# Patient Record
Sex: FEMALE | Race: ASIAN | NOT HISPANIC OR LATINO | URBAN - METROPOLITAN AREA
[De-identification: names, ages, dates, MRNs, and addresses within clinical notes are randomized per-mention and may not be internally consistent; named-entity substitution may affect disease eponyms.]

---

## 2020-05-15 ENCOUNTER — INPATIENT (INPATIENT)
Facility: HOSPITAL | Age: 35
LOS: 2 days | Discharge: HOME | End: 2020-05-18
Attending: OBSTETRICS & GYNECOLOGY | Admitting: OBSTETRICS & GYNECOLOGY
Payer: MEDICAID

## 2020-05-15 DIAGNOSIS — Z3A.00 WEEKS OF GESTATION OF PREGNANCY NOT SPECIFIED: ICD-10-CM

## 2020-05-15 DIAGNOSIS — O26.899 OTHER SPECIFIED PREGNANCY RELATED CONDITIONS, UNSPECIFIED TRIMESTER: ICD-10-CM

## 2020-05-15 DIAGNOSIS — Z34.80 ENCOUNTER FOR SUPERVISION OF OTHER NORMAL PREGNANCY, UNSPECIFIED TRIMESTER: ICD-10-CM

## 2020-05-15 LAB
BASOPHILS # BLD AUTO: 0.04 K/UL — SIGNIFICANT CHANGE UP (ref 0–0.2)
BASOPHILS NFR BLD AUTO: 0.4 % — SIGNIFICANT CHANGE UP (ref 0–2)
BLD GP AB SCN SERPL QL: NEGATIVE — SIGNIFICANT CHANGE UP
EOSINOPHIL # BLD AUTO: 0.18 K/UL — SIGNIFICANT CHANGE UP (ref 0–0.5)
EOSINOPHIL NFR BLD AUTO: 1.9 % — SIGNIFICANT CHANGE UP (ref 0–6)
GLUCOSE BLDC GLUCOMTR-MCNC: 117 MG/DL — HIGH (ref 70–99)
GLUCOSE BLDC GLUCOMTR-MCNC: 121 MG/DL — HIGH (ref 70–99)
HCT VFR BLD CALC: 31 % — LOW (ref 34.5–45)
HGB BLD-MCNC: 9.8 G/DL — LOW (ref 11.5–15.5)
IMM GRANULOCYTES NFR BLD AUTO: 0.7 % — SIGNIFICANT CHANGE UP (ref 0–1.5)
LYMPHOCYTES # BLD AUTO: 1.38 K/UL — SIGNIFICANT CHANGE UP (ref 1–3.3)
LYMPHOCYTES # BLD AUTO: 14.2 % — SIGNIFICANT CHANGE UP (ref 13–44)
MCHC RBC-ENTMCNC: 29.7 PG — SIGNIFICANT CHANGE UP (ref 27–34)
MCHC RBC-ENTMCNC: 31.6 GM/DL — LOW (ref 32–36)
MCV RBC AUTO: 93.9 FL — SIGNIFICANT CHANGE UP (ref 80–100)
MONOCYTES # BLD AUTO: 0.76 K/UL — SIGNIFICANT CHANGE UP (ref 0–0.9)
MONOCYTES NFR BLD AUTO: 7.8 % — SIGNIFICANT CHANGE UP (ref 2–14)
NEUTROPHILS # BLD AUTO: 7.27 K/UL — SIGNIFICANT CHANGE UP (ref 1.8–7.4)
NEUTROPHILS NFR BLD AUTO: 75 % — SIGNIFICANT CHANGE UP (ref 43–77)
NRBC # BLD: 0 /100 WBCS — SIGNIFICANT CHANGE UP (ref 0–0)
PLATELET # BLD AUTO: 213 K/UL — SIGNIFICANT CHANGE UP (ref 150–400)
RBC # BLD: 3.3 M/UL — LOW (ref 3.8–5.2)
RBC # FLD: 14.7 % — HIGH (ref 10.3–14.5)
RH IG SCN BLD-IMP: POSITIVE — SIGNIFICANT CHANGE UP
RH IG SCN BLD-IMP: POSITIVE — SIGNIFICANT CHANGE UP
SARS-COV-2 RNA SPEC QL NAA+PROBE: SIGNIFICANT CHANGE UP
WBC # BLD: 9.7 K/UL — SIGNIFICANT CHANGE UP (ref 3.8–10.5)
WBC # FLD AUTO: 9.7 K/UL — SIGNIFICANT CHANGE UP (ref 3.8–10.5)

## 2020-05-15 RX ORDER — SODIUM CHLORIDE 9 MG/ML
1000 INJECTION, SOLUTION INTRAVENOUS
Refills: 0 | Status: DISCONTINUED | OUTPATIENT
Start: 2020-05-15 | End: 2020-05-16

## 2020-05-15 RX ORDER — OXYTOCIN 10 UNIT/ML
333.33 VIAL (ML) INJECTION
Qty: 20 | Refills: 0 | Status: DISCONTINUED | OUTPATIENT
Start: 2020-05-15 | End: 2020-05-18

## 2020-05-15 RX ORDER — CITRIC ACID/SODIUM CITRATE 300-500 MG
15 SOLUTION, ORAL ORAL EVERY 6 HOURS
Refills: 0 | Status: DISCONTINUED | OUTPATIENT
Start: 2020-05-15 | End: 2020-05-16

## 2020-05-15 RX ORDER — SODIUM CHLORIDE 9 MG/ML
1000 INJECTION INTRAMUSCULAR; INTRAVENOUS; SUBCUTANEOUS
Refills: 0 | Status: DISCONTINUED | OUTPATIENT
Start: 2020-05-15 | End: 2020-05-16

## 2020-05-16 VITALS — WEIGHT: 167.55 LBS | HEIGHT: 62 IN

## 2020-05-16 LAB — T PALLIDUM AB TITR SER: NEGATIVE — SIGNIFICANT CHANGE UP

## 2020-05-16 RX ORDER — MAGNESIUM HYDROXIDE 400 MG/1
30 TABLET, CHEWABLE ORAL
Refills: 0 | Status: DISCONTINUED | OUTPATIENT
Start: 2020-05-16 | End: 2020-05-18

## 2020-05-16 RX ORDER — OXYTOCIN 10 UNIT/ML
4 VIAL (ML) INJECTION
Qty: 30 | Refills: 0 | Status: DISCONTINUED | OUTPATIENT
Start: 2020-05-16 | End: 2020-05-16

## 2020-05-16 RX ORDER — BENZOCAINE 10 %
1 GEL (GRAM) MUCOUS MEMBRANE EVERY 6 HOURS
Refills: 0 | Status: DISCONTINUED | OUTPATIENT
Start: 2020-05-16 | End: 2020-05-18

## 2020-05-16 RX ORDER — DIPHENHYDRAMINE HCL 50 MG
25 CAPSULE ORAL EVERY 6 HOURS
Refills: 0 | Status: DISCONTINUED | OUTPATIENT
Start: 2020-05-16 | End: 2020-05-18

## 2020-05-16 RX ORDER — AER TRAVELER 0.5 G/1
1 SOLUTION RECTAL; TOPICAL EVERY 4 HOURS
Refills: 0 | Status: DISCONTINUED | OUTPATIENT
Start: 2020-05-16 | End: 2020-05-18

## 2020-05-16 RX ORDER — IBUPROFEN 200 MG
600 TABLET ORAL EVERY 6 HOURS
Refills: 0 | Status: COMPLETED | OUTPATIENT
Start: 2020-05-16 | End: 2021-04-14

## 2020-05-16 RX ORDER — IBUPROFEN 200 MG
600 TABLET ORAL EVERY 6 HOURS
Refills: 0 | Status: DISCONTINUED | OUTPATIENT
Start: 2020-05-16 | End: 2020-05-18

## 2020-05-16 RX ORDER — ONDANSETRON 8 MG/1
4 TABLET, FILM COATED ORAL ONCE
Refills: 0 | Status: COMPLETED | OUTPATIENT
Start: 2020-05-16 | End: 2020-05-16

## 2020-05-16 RX ORDER — OXYTOCIN 10 UNIT/ML
333.33 VIAL (ML) INJECTION
Qty: 20 | Refills: 0 | Status: DISCONTINUED | OUTPATIENT
Start: 2020-05-16 | End: 2020-05-18

## 2020-05-16 RX ORDER — BACITRACIN ZINC 500 UNIT/G
1 OINTMENT IN PACKET (EA) TOPICAL ONCE
Refills: 0 | Status: COMPLETED | OUTPATIENT
Start: 2020-05-16 | End: 2020-05-16

## 2020-05-16 RX ORDER — DIBUCAINE 1 %
1 OINTMENT (GRAM) RECTAL EVERY 6 HOURS
Refills: 0 | Status: DISCONTINUED | OUTPATIENT
Start: 2020-05-16 | End: 2020-05-18

## 2020-05-16 RX ORDER — KETOROLAC TROMETHAMINE 30 MG/ML
30 SYRINGE (ML) INJECTION ONCE
Refills: 0 | Status: DISCONTINUED | OUTPATIENT
Start: 2020-05-16 | End: 2020-05-16

## 2020-05-16 RX ORDER — HYDROCORTISONE 1 %
1 OINTMENT (GRAM) TOPICAL EVERY 6 HOURS
Refills: 0 | Status: DISCONTINUED | OUTPATIENT
Start: 2020-05-16 | End: 2020-05-18

## 2020-05-16 RX ORDER — OXYCODONE HYDROCHLORIDE 5 MG/1
5 TABLET ORAL ONCE
Refills: 0 | Status: DISCONTINUED | OUTPATIENT
Start: 2020-05-16 | End: 2020-05-18

## 2020-05-16 RX ORDER — SODIUM CHLORIDE 9 MG/ML
3 INJECTION INTRAMUSCULAR; INTRAVENOUS; SUBCUTANEOUS EVERY 8 HOURS
Refills: 0 | Status: DISCONTINUED | OUTPATIENT
Start: 2020-05-16 | End: 2020-05-18

## 2020-05-16 RX ORDER — ACETAMINOPHEN 500 MG
975 TABLET ORAL
Refills: 0 | Status: DISCONTINUED | OUTPATIENT
Start: 2020-05-16 | End: 2020-05-18

## 2020-05-16 RX ORDER — LANOLIN
1 OINTMENT (GRAM) TOPICAL EVERY 6 HOURS
Refills: 0 | Status: DISCONTINUED | OUTPATIENT
Start: 2020-05-16 | End: 2020-05-18

## 2020-05-16 RX ORDER — INFLUENZA VIRUS VACCINE 15; 15; 15; 15 UG/.5ML; UG/.5ML; UG/.5ML; UG/.5ML
0.5 SUSPENSION INTRAMUSCULAR ONCE
Refills: 0 | Status: DISCONTINUED | OUTPATIENT
Start: 2020-05-16 | End: 2020-05-18

## 2020-05-16 RX ORDER — OXYCODONE HYDROCHLORIDE 5 MG/1
5 TABLET ORAL
Refills: 0 | Status: DISCONTINUED | OUTPATIENT
Start: 2020-05-16 | End: 2020-05-18

## 2020-05-16 RX ORDER — PRAMOXINE HYDROCHLORIDE 150 MG/15G
1 AEROSOL, FOAM RECTAL EVERY 4 HOURS
Refills: 0 | Status: DISCONTINUED | OUTPATIENT
Start: 2020-05-16 | End: 2020-05-18

## 2020-05-16 RX ORDER — TETANUS TOXOID, REDUCED DIPHTHERIA TOXOID AND ACELLULAR PERTUSSIS VACCINE, ADSORBED 5; 2.5; 8; 8; 2.5 [IU]/.5ML; [IU]/.5ML; UG/.5ML; UG/.5ML; UG/.5ML
0.5 SUSPENSION INTRAMUSCULAR ONCE
Refills: 0 | Status: DISCONTINUED | OUTPATIENT
Start: 2020-05-16 | End: 2020-05-18

## 2020-05-16 RX ORDER — SIMETHICONE 80 MG/1
80 TABLET, CHEWABLE ORAL EVERY 4 HOURS
Refills: 0 | Status: DISCONTINUED | OUTPATIENT
Start: 2020-05-16 | End: 2020-05-18

## 2020-05-16 RX ADMIN — ONDANSETRON 4 MILLIGRAM(S): 8 TABLET, FILM COATED ORAL at 07:27

## 2020-05-16 RX ADMIN — Medication 30 MILLIGRAM(S): at 11:39

## 2020-05-16 RX ADMIN — Medication 4 MILLIUNIT(S)/MIN: at 03:15

## 2020-05-16 RX ADMIN — Medication 975 MILLIGRAM(S): at 14:03

## 2020-05-16 RX ADMIN — Medication 975 MILLIGRAM(S): at 20:20

## 2020-05-16 RX ADMIN — Medication 1 APPLICATION(S): at 13:53

## 2020-05-16 RX ADMIN — Medication 600 MILLIGRAM(S): at 19:06

## 2020-05-17 LAB
HCT VFR BLD CALC: 26.3 % — LOW (ref 34.5–45)
HGB BLD-MCNC: 8 G/DL — LOW (ref 11.5–15.5)
MCHC RBC-ENTMCNC: 29 PG — SIGNIFICANT CHANGE UP (ref 27–34)
MCHC RBC-ENTMCNC: 30.4 GM/DL — LOW (ref 32–36)
MCV RBC AUTO: 95.3 FL — SIGNIFICANT CHANGE UP (ref 80–100)
NRBC # BLD: 0 /100 WBCS — SIGNIFICANT CHANGE UP (ref 0–0)
PLATELET # BLD AUTO: 167 K/UL — SIGNIFICANT CHANGE UP (ref 150–400)
RBC # BLD: 2.76 M/UL — LOW (ref 3.8–5.2)
RBC # FLD: 14.9 % — HIGH (ref 10.3–14.5)
WBC # BLD: 14.94 K/UL — HIGH (ref 3.8–10.5)
WBC # FLD AUTO: 14.94 K/UL — HIGH (ref 3.8–10.5)

## 2020-05-17 PROCEDURE — 72170 X-RAY EXAM OF PELVIS: CPT | Mod: 26

## 2020-05-17 RX ORDER — ACETAMINOPHEN 500 MG
3 TABLET ORAL
Qty: 0 | Refills: 0 | DISCHARGE
Start: 2020-05-17

## 2020-05-17 RX ORDER — IBUPROFEN 200 MG
1 TABLET ORAL
Qty: 0 | Refills: 0 | DISCHARGE
Start: 2020-05-17

## 2020-05-17 RX ADMIN — Medication 975 MILLIGRAM(S): at 02:16

## 2020-05-17 RX ADMIN — Medication 600 MILLIGRAM(S): at 05:29

## 2020-05-17 RX ADMIN — Medication 600 MILLIGRAM(S): at 23:50

## 2020-05-17 RX ADMIN — OXYCODONE HYDROCHLORIDE 5 MILLIGRAM(S): 5 TABLET ORAL at 05:30

## 2020-05-17 RX ADMIN — Medication 600 MILLIGRAM(S): at 00:19

## 2020-05-17 RX ADMIN — Medication 975 MILLIGRAM(S): at 18:35

## 2020-05-17 RX ADMIN — Medication 600 MILLIGRAM(S): at 10:13

## 2020-05-17 RX ADMIN — Medication 600 MILLIGRAM(S): at 18:35

## 2020-05-17 RX ADMIN — Medication 975 MILLIGRAM(S): at 23:50

## 2020-05-17 RX ADMIN — Medication 975 MILLIGRAM(S): at 08:20

## 2020-05-17 NOTE — DISCHARGE NOTE OB - PLAN OF CARE
recovery follow up in the office in six weeks; please call to confirm your appointment; regular diet; pain control with motrin and tylenol; nothing per vagina; no heavy lifting; call with any concerns or questions

## 2020-05-17 NOTE — PROGRESS NOTE ADULT - ASSESSMENT
A/P: 33yo PPD#1 s/p VAVD.  Now with pain with ambulation and pubic symphysis tenderness - c/w possible pubic symphysis seperation.  - Pelvic Xray  -po pain meds  - PT consult  - cont routine pp care  - hold off dc today    GONZALO Carter

## 2020-05-17 NOTE — DISCHARGE NOTE OB - PATIENT PORTAL LINK FT
You can access the FollowMyHealth Patient Portal offered by [facility] by registering at the following website: [email or web address]. By joining [facility]’s FollowMyHealth portal, you will also be able to view your health information using other applications (apps) compatible with our system.

## 2020-05-17 NOTE — DISCHARGE NOTE OB - CARE PROVIDER_API CALL
[facility],   [address] [place] [address], [place]  Phone: [phone]  Fax: (   )    -  Follow Up Time:

## 2020-05-17 NOTE — PROGRESS NOTE ADULT - SUBJECTIVE AND OBJECTIVE BOX
OB Progress Note: VAVD, PPD#1    S: Patient c/o pain with getting up out of bed and ambulating.  Only able to ambulate while on toes.  Needs partner for assistance.  She is tolerating a regular diet. She is voiding spontaneously,    O:  Vitals:  Vital Signs Last 24 Hrs  T(C): 37.1 (17 May 2020 05:55), Max: 37.1 (17 May 2020 05:55)  T(F): 98.8 (17 May 2020 05:55), Max: 98.8 (17 May 2020 05:55)  HR: 90 (17 May 2020 05:55) (68 - 103)  BP: 109/66 (17 May 2020 05:55) (97/60 - 109/66)  BP(mean): --  RR: 18 (17 May 2020 05:55) (16 - 18)  SpO2: 97% (17 May 2020 05:55) (96% - 100%)    MEDICATIONS  (STANDING):  acetaminophen   Tablet .. 975 milliGRAM(s) Oral <User Schedule>  diphtheria/tetanus/pertussis (acellular) Vaccine (ADAcel) 0.5 milliLiter(s) IntraMuscular once  ibuprofen  Tablet. 600 milliGRAM(s) Oral every 6 hours  influenza   Vaccine 0.5 milliLiter(s) IntraMuscular once  oxytocin Infusion 333.333 milliUNIT(s)/Min (1000 mL/Hr) IV Continuous <Continuous>  oxytocin Infusion 333.333 milliUNIT(s)/Min (1000 mL/Hr) IV Continuous <Continuous>  prenatal multivitamin 1 Tablet(s) Oral daily  sodium chloride 0.9% lock flush 3 milliLiter(s) IV Push every 8 hours      Labs:  Blood type: A Positive  Rubella IgG: RPR: Negative                          8.0<L>   14.94<H> >-----------< 167    ( 05-17 @ 06:23 )             26.3<L>                        9.8<L>   9.70 >-----------< 213    ( 05-15 @ 21:47 )             31.0<L>                  Physical Exam:  General: mild distress with ambulation  Abdomen: soft, non-tender, non-distended, fundus firm  Vaginal: Lochia wnl, +pubic symphysis tenderness  Extremities: No erythema/edema

## 2020-05-17 NOTE — DISCHARGE NOTE OB - PROVIDER TOKENS
FREE:[LAST:[[facility]],PHONE:[phone]],FAX:[(   )    -],ADDRESS:[[address] [place] [address]]]

## 2020-05-17 NOTE — DISCHARGE NOTE OB - CARE PLAN
Principal Discharge DX:	Vacuum extractor delivery, delivered  Goal:	recovery  Assessment and plan of treatment:	follow up in the office in six weeks; please call to confirm your appointment; regular diet; pain control with motrin and tylenol; nothing per vagina; no heavy lifting; call with any concerns or questions

## 2020-05-17 NOTE — DISCHARGE NOTE OB - HOSPITAL COURSE
Pt had an uncomplicated VAVD followed by an uncomplicated postpartum course.  EBL:500   Hct: 31.2->26.3     On Postpartum day 1, patient was discharged home in stable condition, voiding spontaneously, pain well controlled, ambulating, tolerating PO and with normal vital signs. Pt plans to follow up in the [facility] in 6 weeks. Telephone number and clinic information provided prior to discharge. Pt had an uncomplicated VAVD with an EBL of 500cc. Patient noted to have difficulty ambulating and a pelvic xray was performed showing a 1 cm diastesis. Patient was diagnosed with pubic symphysis separation. PT consulted and recommended to use a walker at home.   EBL:500   Hct: 31.2->26.3     On Postpartum day 2, patient was discharged home in stable condition, voiding spontaneously, pain well controlled, ambulating with assistance, tolerating PO and with normal vital signs. Pt plans to follow up in the [facility] in 6 weeks. Telephone number and clinic information provided prior to discharge.

## 2020-05-17 NOTE — DISCHARGE NOTE OB - MATERIALS PROVIDED
Breastfeeding Mother’s Support Group Information/Guide to Postpartum Care/Back To Sleep Handout/Vaccinations/[place]  Immunization Record/[facility]  Screening Program/Breastfeeding Log/Shaken Baby Prevention Handout/Breastfeeding Guide and Packet/Birth Certificate Instructions

## 2020-05-18 VITALS
RESPIRATION RATE: 17 BRPM | HEART RATE: 98 BPM | TEMPERATURE: 98 F | SYSTOLIC BLOOD PRESSURE: 112 MMHG | DIASTOLIC BLOOD PRESSURE: 74 MMHG

## 2020-05-18 PROCEDURE — 85027 COMPLETE CBC AUTOMATED: CPT

## 2020-05-18 PROCEDURE — 86780 TREPONEMA PALLIDUM: CPT

## 2020-05-18 PROCEDURE — 86900 BLOOD TYPING SEROLOGIC ABO: CPT

## 2020-05-18 PROCEDURE — 97161 PT EVAL LOW COMPLEX 20 MIN: CPT

## 2020-05-18 PROCEDURE — 59025 FETAL NON-STRESS TEST: CPT

## 2020-05-18 PROCEDURE — 82962 GLUCOSE BLOOD TEST: CPT

## 2020-05-18 PROCEDURE — 59050 FETAL MONITOR W/REPORT: CPT

## 2020-05-18 PROCEDURE — 72170 X-RAY EXAM OF PELVIS: CPT

## 2020-05-18 PROCEDURE — 86901 BLOOD TYPING SEROLOGIC RH(D): CPT

## 2020-05-18 PROCEDURE — 86850 RBC ANTIBODY SCREEN: CPT

## 2020-05-18 PROCEDURE — G0463: CPT

## 2020-05-18 RX ADMIN — MAGNESIUM HYDROXIDE 30 MILLILITER(S): 400 TABLET, CHEWABLE ORAL at 11:06

## 2020-05-18 RX ADMIN — Medication 975 MILLIGRAM(S): at 05:30

## 2020-05-18 RX ADMIN — Medication 600 MILLIGRAM(S): at 05:30

## 2020-05-18 RX ADMIN — Medication 600 MILLIGRAM(S): at 11:05

## 2020-05-18 RX ADMIN — Medication 975 MILLIGRAM(S): at 13:48

## 2020-05-18 NOTE — PROGRESS NOTE ADULT - SUBJECTIVE AND OBJECTIVE BOX
OB Attending Note    S: Patient doing well. Minimal lochia. Pain controlled. Patient states her pubic pain is improved. She is ambulating to toilet without difficulty. No dysuria    O: Vital Signs Last 24 Hrs  T(C): 36.6 (18 May 2020 05:37), Max: 36.9 (17 May 2020 08:06)  T(F): 97.8 (18 May 2020 05:37), Max: 98.5 (17 May 2020 08:06)  HR: 92 (18 May 2020 05:37) (92 - 98)  BP: 96/60 (18 May 2020 05:37) (96/60 - 100/65)  BP(mean): --  RR: 18 (18 May 2020 05:37) (18 - 18)  SpO2: 97% (18 May 2020 05:37) (96% - 97%)    Gen: NAD  Abd: soft, NT, ND, fundus firm below umbilicus  Lochia: min  Perineum healing well  : + pubic symphysis tenderness. no ecchymosis   Ext: no tenderness    Labs:                        8.0    14.94 )-----------( 167      ( 17 May 2020 06:23 )             26.3

## 2020-05-18 NOTE — PROVIDER CONTACT NOTE (OTHER) - SITUATION
Pt is nonimmune to varicella. Offered to patient to receive varicella vaccine in hospital before discharge. Pt refused vaccine. Pt states she will follow up with her doctor concerning vaccine.

## 2020-05-18 NOTE — PROGRESS NOTE ADULT - ASSESSMENT
A: 34y PPD#2 s/p VAVD with pubic symphysis separation     Plan: cont PP care  OOB/ambulation  Pain control  For PT to evaluate today  Discharge planning. Reviewed discharge instructions. Will give number to clinic to follow up which was reviewed.    Diane Mac DO

## 2020-05-18 NOTE — PHYSICAL THERAPY INITIAL EVALUATION ADULT - ADDITIONAL COMMENTS
lives with  in private home, 3 steps to enter w/ handrail/ and flight of stairs to bedroom, pt has been independent w/ ADL;s and amb but had antepartum pubic symphysis issues pt reports

## 2020-06-06 ENCOUNTER — EMERGENCY (EMERGENCY)
Facility: HOSPITAL | Age: 35
LOS: 1 days | Discharge: HOME | End: 2020-06-06
Attending: EMERGENCY MEDICINE
Payer: MEDICAID

## 2020-06-06 VITALS
WEIGHT: 130.07 LBS | HEIGHT: 62 IN | HEART RATE: 86 BPM | SYSTOLIC BLOOD PRESSURE: 99 MMHG | RESPIRATION RATE: 16 BRPM | OXYGEN SATURATION: 97 % | DIASTOLIC BLOOD PRESSURE: 68 MMHG | TEMPERATURE: 98 F

## 2020-06-06 VITALS
RESPIRATION RATE: 16 BRPM | OXYGEN SATURATION: 99 % | DIASTOLIC BLOOD PRESSURE: 70 MMHG | TEMPERATURE: 99 F | SYSTOLIC BLOOD PRESSURE: 108 MMHG | HEART RATE: 81 BPM

## 2020-06-06 LAB
ALBUMIN SERPL ELPH-MCNC: 4.3 G/DL — SIGNIFICANT CHANGE UP (ref 3.3–5)
ALP SERPL-CCNC: 78 U/L — SIGNIFICANT CHANGE UP (ref 40–120)
ALT FLD-CCNC: 18 U/L — SIGNIFICANT CHANGE UP (ref 10–45)
ANION GAP SERPL CALC-SCNC: 15 MMOL/L — SIGNIFICANT CHANGE UP (ref 5–17)
APPEARANCE UR: ABNORMAL
AST SERPL-CCNC: 28 U/L — SIGNIFICANT CHANGE UP (ref 10–40)
BACTERIA # UR AUTO: NEGATIVE — SIGNIFICANT CHANGE UP
BASOPHILS # BLD AUTO: 0.04 K/UL — SIGNIFICANT CHANGE UP (ref 0–0.2)
BASOPHILS NFR BLD AUTO: 0.3 % — SIGNIFICANT CHANGE UP (ref 0–2)
BILIRUB SERPL-MCNC: 0.4 MG/DL — SIGNIFICANT CHANGE UP (ref 0.2–1.2)
BILIRUB UR-MCNC: NEGATIVE — SIGNIFICANT CHANGE UP
BUN SERPL-MCNC: 14 MG/DL — SIGNIFICANT CHANGE UP (ref 7–23)
CALCIUM SERPL-MCNC: 9.2 MG/DL — SIGNIFICANT CHANGE UP (ref 8.4–10.5)
CHLORIDE SERPL-SCNC: 102 MMOL/L — SIGNIFICANT CHANGE UP (ref 96–108)
CO2 SERPL-SCNC: 20 MMOL/L — LOW (ref 22–31)
COLOR SPEC: YELLOW — SIGNIFICANT CHANGE UP
CREAT SERPL-MCNC: 0.42 MG/DL — LOW (ref 0.5–1.3)
DIFF PNL FLD: ABNORMAL
EOSINOPHIL # BLD AUTO: 0.13 K/UL — SIGNIFICANT CHANGE UP (ref 0–0.5)
EOSINOPHIL NFR BLD AUTO: 0.9 % — SIGNIFICANT CHANGE UP (ref 0–6)
EPI CELLS # UR: 1 /HPF — SIGNIFICANT CHANGE UP
GLUCOSE SERPL-MCNC: 100 MG/DL — HIGH (ref 70–99)
GLUCOSE UR QL: NEGATIVE — SIGNIFICANT CHANGE UP
HCG SERPL-ACNC: <2 MIU/ML — SIGNIFICANT CHANGE UP
HCT VFR BLD CALC: 34.6 % — SIGNIFICANT CHANGE UP (ref 34.5–45)
HGB BLD-MCNC: 11.2 G/DL — LOW (ref 11.5–15.5)
HYALINE CASTS # UR AUTO: 1 /LPF — SIGNIFICANT CHANGE UP (ref 0–7)
IMM GRANULOCYTES NFR BLD AUTO: 0.9 % — SIGNIFICANT CHANGE UP (ref 0–1.5)
KETONES UR-MCNC: NEGATIVE — SIGNIFICANT CHANGE UP
LACTATE BLDV-MCNC: 2.5 MMOL/L — HIGH (ref 0.7–2)
LEUKOCYTE ESTERASE UR-ACNC: ABNORMAL
LIDOCAIN IGE QN: 34 U/L — SIGNIFICANT CHANGE UP (ref 7–60)
LYMPHOCYTES # BLD AUTO: 1.01 K/UL — SIGNIFICANT CHANGE UP (ref 1–3.3)
LYMPHOCYTES # BLD AUTO: 7.3 % — LOW (ref 13–44)
MCHC RBC-ENTMCNC: 29.9 PG — SIGNIFICANT CHANGE UP (ref 27–34)
MCHC RBC-ENTMCNC: 32.4 GM/DL — SIGNIFICANT CHANGE UP (ref 32–36)
MCV RBC AUTO: 92.5 FL — SIGNIFICANT CHANGE UP (ref 80–100)
MONOCYTES # BLD AUTO: 0.58 K/UL — SIGNIFICANT CHANGE UP (ref 0–0.9)
MONOCYTES NFR BLD AUTO: 4.2 % — SIGNIFICANT CHANGE UP (ref 2–14)
NEUTROPHILS # BLD AUTO: 11.87 K/UL — HIGH (ref 1.8–7.4)
NEUTROPHILS NFR BLD AUTO: 86.4 % — HIGH (ref 43–77)
NITRITE UR-MCNC: NEGATIVE — SIGNIFICANT CHANGE UP
NRBC # BLD: 0 /100 WBCS — SIGNIFICANT CHANGE UP (ref 0–0)
PH UR: 6.5 — SIGNIFICANT CHANGE UP (ref 5–8)
PLATELET # BLD AUTO: 244 K/UL — SIGNIFICANT CHANGE UP (ref 150–400)
POTASSIUM SERPL-MCNC: 4.4 MMOL/L — SIGNIFICANT CHANGE UP (ref 3.5–5.3)
POTASSIUM SERPL-SCNC: 4.4 MMOL/L — SIGNIFICANT CHANGE UP (ref 3.5–5.3)
PROT SERPL-MCNC: 7.6 G/DL — SIGNIFICANT CHANGE UP (ref 6–8.3)
PROT UR-MCNC: ABNORMAL
RBC # BLD: 3.74 M/UL — LOW (ref 3.8–5.2)
RBC # FLD: 14.6 % — HIGH (ref 10.3–14.5)
RBC CASTS # UR COMP ASSIST: 5 /HPF — HIGH (ref 0–4)
SODIUM SERPL-SCNC: 137 MMOL/L — SIGNIFICANT CHANGE UP (ref 135–145)
SP GR SPEC: 1.03 — HIGH (ref 1.01–1.02)
UROBILINOGEN FLD QL: NEGATIVE — SIGNIFICANT CHANGE UP
WBC # BLD: 13.76 K/UL — HIGH (ref 3.8–10.5)
WBC # FLD AUTO: 13.76 K/UL — HIGH (ref 3.8–10.5)
WBC UR QL: 136 /HPF — HIGH (ref 0–5)

## 2020-06-06 PROCEDURE — 76815 OB US LIMITED FETUS(S): CPT

## 2020-06-06 PROCEDURE — 99284 EMERGENCY DEPT VISIT MOD MDM: CPT

## 2020-06-06 PROCEDURE — 84702 CHORIONIC GONADOTROPIN TEST: CPT

## 2020-06-06 PROCEDURE — 93976 VASCULAR STUDY: CPT | Mod: 26

## 2020-06-06 PROCEDURE — 99285 EMERGENCY DEPT VISIT HI MDM: CPT

## 2020-06-06 PROCEDURE — 87086 URINE CULTURE/COLONY COUNT: CPT

## 2020-06-06 PROCEDURE — 99283 EMERGENCY DEPT VISIT LOW MDM: CPT

## 2020-06-06 PROCEDURE — 87040 BLOOD CULTURE FOR BACTERIA: CPT

## 2020-06-06 PROCEDURE — 85027 COMPLETE CBC AUTOMATED: CPT

## 2020-06-06 PROCEDURE — 93976 VASCULAR STUDY: CPT

## 2020-06-06 PROCEDURE — 76815 OB US LIMITED FETUS(S): CPT | Mod: 26

## 2020-06-06 PROCEDURE — 83690 ASSAY OF LIPASE: CPT

## 2020-06-06 PROCEDURE — 83605 ASSAY OF LACTIC ACID: CPT

## 2020-06-06 PROCEDURE — 81001 URINALYSIS AUTO W/SCOPE: CPT

## 2020-06-06 PROCEDURE — 80053 COMPREHEN METABOLIC PANEL: CPT

## 2020-06-06 RX ORDER — SODIUM CHLORIDE 9 MG/ML
1000 INJECTION INTRAMUSCULAR; INTRAVENOUS; SUBCUTANEOUS ONCE
Refills: 0 | Status: COMPLETED | OUTPATIENT
Start: 2020-06-06 | End: 2020-06-06

## 2020-06-06 RX ADMIN — Medication 1 TABLET(S): at 16:37

## 2020-06-06 RX ADMIN — SODIUM CHLORIDE 1000 MILLILITER(S): 9 INJECTION INTRAMUSCULAR; INTRAVENOUS; SUBCUTANEOUS at 16:12

## 2020-06-06 NOTE — ED ADULT NURSE NOTE - OBJECTIVE STATEMENT
complaining of generalized abdominal pain since today. Pt states, "I had a baby on May 16, today complaining of generalized abdominal pain since today. Pt states, "I had a baby on May 16, last night I had belly pain and I thought it was stomach acid, I took motrin and it didn't go away. I was nauseas this morning and vomited twice. Im just coming in to make sure im okay because my belly is tender since the baby and I had chills this morning". Pt endorses N/V abdominal pain and chills. Pt denies blurred vision, headache, lightheadedness, dizziness, sore throat, cough SOB. chest pain, diarrhea, urinary symptoms numbness and tingling at present. Pt has generalized tenderness on palpation. -CVA sign. complaining of generalized abdominal pain since today. Pt states, "I had a baby on May 16, last night I had belly pain and I thought it was stomach acid, I took motrin and it didn't go away. I was nauseas this morning and and tried to vomit twice but nothing came out. Im just coming in to make sure im okay because my belly is tender since the baby and I had chills this morning". Pt endorses Nausea and abdominal pain and chills. Pt denies blurred vision, headache, lightheadedness, dizziness, sore throat, cough SOB. chest pain, diarrhea, urinary symptoms numbness and tingling at present. Pt has generalized tenderness on palpation. -CVA sign. This is mothers first pregnancy.

## 2020-06-06 NOTE — ED PROVIDER NOTE - NSFOLLOWUPINSTRUCTIONS_ED_ALL_ED_FT
Thank you for visiting our Emergency Department, it has been a pleasure taking part in your healthcare.    Please follow up with your Ob/Gyn within 1 week.       Endometritis  Endometritis is irritation, soreness, or inflammation that affects the lining of the uterus (endometrium).  Infection is usually the cause of endometritis. It is important to get treatment to prevent complications. Common complications may include more severe infections and not being able to have children(infertility).  What are the causes?  This condition may be caused by:  Bacterial infections.STIs (sexually transmitted infections).A miscarriage or childbirth, especially after a long labor or  delivery.Certain gynecological procedures. These may include dilation and curettage (D&C), hysteroscopy, or birth control (contraceptive) insertion.Tuberculosis (TB).What are the signs or symptoms?  Symptoms of this condition include:  Fever.Lower abdomen (abdominal) pain.Pelvis (pelvic) pain.Abnormal vaginal discharge or bleeding.Abdominal bloating (distention) or swelling.General discomfort or generally feeling ill.Discomfort with bowel movements.Constipation.How is this diagnosed?  This condition may be diagnosed based on:  A physical exam, including a pelvic exam.Tests, such as:  Blood tests.Removal of a sample of endometrial tissue for testing (endometrial biopsy).Examining a sample of vaginal discharge under a microscope (wet prep).Removal of a sample of fluid from the cervix for testing (cervical culture).Surgical examination of the pelvis and abdomen.How is this treated?  This condition is treated with:  Antibiotic medicines.For more severe cases, hospitalization may be needed to give fluids and antibiotics directly into a vein through an IV tube.Follow these instructions at home:  Take over-the-counter and prescription medicines only as told by your health care provider.Drink enough fluid to keep your urine clear or pale yellow.Take your antibiotic medicine as told by your health care provider. Do not stop taking the antibiotic even if you start to feel better.Do not douche or have sex (including vaginal, oral, and anal sex) until your health care provider approves.If your endometritis was caused by an STI, do not have sex (including vaginal, oral, and anal sex) until your partner has also been treated for the STI.Return to your normal activities as told by your health care provider. Ask your health care provider what activities are safe for you.Keep all follow-up visits as told by your health care provider. This is important.Contact a health care provider if:  You have pain that does not get better with medicine.You have a fever.You have pain with bowel movements.Get help right away if:  You have abdominal swelling.You have abdominal pain that gets worse.You have bad-smelling vaginal discharge, or an increased amount of vaginal discharge.You have abnormal vaginal bleeding.You have nausea and vomiting.Summary  Endometritis affects the lining of the uterus (endometrium) and is usually caused by an infection.It is important to get treatment to prevent complications.You have several treatment options for endometritis. Treatment may include antibiotics and IV fluids.Take your antibiotic medicine as told by your health care provider. Do not stop taking the antibiotic even if you start to feel better.Do not douche or have sex (including vaginal, oral, and anal sex) until your health care provider approves.This information is not intended to replace advice given to you by your health care provider. Make sure you discuss any questions you have with your health care provider

## 2020-06-06 NOTE — ED PROVIDER NOTE - CLINICAL SUMMARY MEDICAL DECISION MAKING FREE TEXT BOX
Olivia Romeo MD - Attending Physician: Pt here with pelvic pain, nausea/vomiting, chills. S/p Motrin with LGF here and chills therefore likely febrile at home. Mild suprapubic tenderness. Concern for endometritis vs uti. No clinical signs of mastitis. Labs, cultures, Ua, US for eval Olivia Romeo MD - Attending Physician: Pt here with pelvic pain, nausea/vomiting, chills. S/p Motrin with LGF here and chills therefore likely febrile at home. Mild suprapubic tenderness. Concern for endometritis vs uti. No clinical signs of mastitis. Labs, cultures, Ua, US for eval. Declined need for nausea/pain meds at this time

## 2020-06-06 NOTE — ED PROVIDER NOTE - OBJECTIVE STATEMENT
Pt here 3 wks postpartum p/w pelvic pain. Began approx 10 hours ago, initially started suprapubic with radiating upward. Pt reports constant pain that progressively got worse. Developed chills. Did not measure her temp. Also with nausea and recurrent vomiting prior to arrival. Tool Ibuprofen 400mg approx 1 hour prior to arrival. Some improvement in pain and improved chills. No increased bleeding. No dysuria. Breastfeeding - denies breast pain, bleeding, or erythema. No URI symptoms. No CP/SOB, LE edema. No diarrhea. No known sick contacts.

## 2020-06-06 NOTE — ED PROVIDER NOTE - SEVERE SEPSIS ALERT DETAILS
Olivia Romeo MD - Attending Physician: I have seen and evaluated this patient and do not believe the patient is septic at this time.  I will continue to evaluate.

## 2020-06-06 NOTE — ED PROVIDER NOTE - PROGRESS NOTE DETAILS
Labs thus far unremarkable. Gyn at bedside evaluating patient. If no significant abnormality on US will likely dc on Augmentin for empiric tx of endometritis. Awaiting full UA micro and US read. Noted UA. Given no bacteria, no dysuria, less concerned for UTI. Will treat with Augmentin for endometritis, culture sent for Ur. D/w patient if worsening symptoms, urinary complaints to call pmd sooner. Awaiting US official read for likely dc

## 2020-06-06 NOTE — ED PROVIDER NOTE - ATTENDING CONTRIBUTION TO CARE
Olivia Romeo MD - Attending Physician: I have personally seen and examined this patient with the resident/fellow.  I have fully participated in the care of this patient. I have reviewed all pertinent clinical information, including history, physical exam, plan and the Resident/Fellow’s note and agree except as noted. See MDM

## 2020-06-06 NOTE — ED PROVIDER NOTE - GASTROINTESTINAL, MLM
Abdomen soft, nondistended. Mild suprapubic to bilateral pelvic tenderness, no rebound, no guarding.

## 2020-06-06 NOTE — ED ADULT NURSE NOTE - SPO2 (%)
S/P Fall X 2 over the past week
99

## 2020-06-06 NOTE — CONSULT NOTE ADULT - SUBJECTIVE AND OBJECTIVE BOX
KAITLYN MILLER  34y  Female [id number]    HPI: 33yo s/p VAVD c/b  pubic symphysis 3w ago p/w new onset abdominal pain since last night. Patient has had mild nausea but is able to tolerate PO. She has minimal VB. Denies any purulence or discharge. She is breast feeding and denies any breast pain or redness. No CP, SOB, difficulty breathing. No dysuria or hematuria.    Name of GYN Physician: MOISES ([facility] @ [facility] in [place])    POB:   as above (VAVD)    Pgyn: Denies fibroids, cysts, endometriosis, STI's, Abnormal pap smears     Home meds: none    Allergies  No Known Allergies    PAST MEDICAL & SURGICAL HISTORY:  No pertinent past medical history  No significant past surgical history    Social History:  Denies smoking use, drug use, alcohol use.      Vital Signs Last 24 Hrs  T(C): 37.3 (2020 14:46), Max: 37.3 (2020 14:46)  T(F): 99.2 (2020 14:46), Max: 99.2 (2020 14:46)  HR: 81 (2020 14:46) (81 - 86)  BP: 108/70 (2020 14:46) (99/68 - 108/70)  BP(mean): --  RR: 16 (2020 14:46) (16 - 16)  SpO2: 99% (2020 14:46) (97% - 99%)    Physical Exam:   General: sitting comftorably in bed, NAD   Ches  Lungs: CTA b/l, good air flow b/l   Back: No CVA tenderness  Abd: Soft, non-tender, non-distended.  Bowel sounds present.    :  No bleeding on pad.    External labia wnl.  Bimanual exam with no cervical motion tenderness, uterus wnl, adnexa non palpable b/l.  Cervix closed vs. Cervix dilated    cm   Speculum Exam: No active bleeding from os.  Physiologic discharge.    Ext: non-tender b/l, no edema     LABS:                              11.2   13.76 )-----------( 244      ( 2020 15:29 )             34.6     06-06    137  |  102  |  14  ----------------------------<  100<H>  4.4   |  20<L>  |  0.42<L>    Ca    9.2      2020 15:29    TPro  7.6  /  Alb  4.3  /  TBili  0.4  /  DBili  x   /  AST  28  /  ALT  18  /  AlkPhos  78      I&O's Detail      Urinalysis Basic - ( 2020 15:02 )    Color: Yellow / Appearance: Slightly Turbid / S.030 / pH: x  Gluc: x / Ketone: Negative  / Bili: Negative / Urobili: Negative   Blood: x / Protein: 30 mg/dL / Nitrite: Negative   Leuk Esterase: Large / RBC: 5 /hpf /  /HPF   Sq Epi: x / Non Sq Epi: 1 /hpf / Bacteria: Negative        RADIOLOGY & ADDITIONAL STUDIES: KAITLYN MILLER  34y  Female [id number]    HPI: 35yo s/p VAVD c/b  pubic symphysis 3w ago p/w new onset abdominal pain since last night. Patient has had mild nausea but is able to tolerate PO. She has minimal VB. Denies any purulence or discharge. She is breast feeding and denies any breast pain or redness. No CP, SOB, difficulty breathing. No dysuria or hematuria.    Name of GYN Physician: MOISES ([facility] in [place])    POB:   as above (VAVD)    Pgyn: Denies fibroids, cysts, endometriosis, STI's, Abnormal pap smears     Home meds: none    Allergies  No Known Allergies    PAST MEDICAL & SURGICAL HISTORY:  No pertinent past medical history  No significant past surgical history    Social History:  Denies smoking use, drug use, alcohol use.      Vital Signs Last 24 Hrs  T(C): 37.3 (2020 14:46), Max: 37.3 (2020 14:46)  T(F): 99.2 (2020 14:46), Max: 99.2 (2020 14:46)  HR: 81 (2020 14:46) (81 - 86)  BP: 108/70 (2020 14:46) (99/68 - 108/70)  BP(mean): --  RR: 16 (2020 14:46) (16 - 16)  SpO2: 99% (2020 14:46) (97% - 99%)    Physical Exam:   General: sitting comftorably in bed, NAD  Abd: Soft, non-tender, non-distended.  No rebound or guarding.  Pubic bone tenderness.  : Lochia wnl.    External labia wnl. Perineal repair intact with no discharge, erythema, or purulence.  Ext: non-tender b/l, no edema     LABS:                              11.2   13.76 )-----------( 244      ( 2020 15:29 )             34.6     06-    137  |  102  |  14  ----------------------------<  100<H>  4.4   |  20<L>  |  0.42<L>    Ca    9.2      2020 15:29    TPro  7.6  /  Alb  4.3  /  TBili  0.4  /  DBili  x   /  AST  28  /  ALT  18  /  AlkPhos  78  -06    I&O's Detail      Urinalysis Basic - ( 2020 15:02 )    Color: Yellow / Appearance: Slightly Turbid / S.030 / pH: x  Gluc: x / Ketone: Negative  / Bili: Negative / Urobili: Negative   Blood: x / Protein: 30 mg/dL / Nitrite: Negative   Leuk Esterase: Large / RBC: 5 /hpf /  /HPF   Sq Epi: x / Non Sq Epi: 1 /hpf / Bacteria: Negative        RADIOLOGY & ADDITIONAL STUDIES:    final read pending

## 2020-06-06 NOTE — ED ADULT NURSE NOTE - SUICIDE SCREENING DEPRESSION
Start taking Florastor, directed as twice a day.    Use Phayzme   Try Pepto for the gas    Use Bentyl as needed for urgency and spasm.   
Negative

## 2020-06-06 NOTE — ED PROVIDER NOTE - PATIENT PORTAL LINK FT
You can access the FollowMyHealth Patient Portal offered by [facility] by registering at the following website: [email or web address]. By joining [employer]’s FollowMyHealth portal, you will also be able to view your health information using other applications (apps) compatible with our system.

## 2020-06-06 NOTE — CONSULT NOTE ADULT - ASSESSMENT
A/P: 33yo s/p VAVD c/b  pubic symphysis 3w ago p/w new onset abdominal pain since last night. Per verbal conversation with radiology, TAUS revealed clean endometrial stripe with low suspicion for retained products. Final read pending. Afebrile.    - d/c home with augmentin x5-7d for possible endometritis (pending confirmation of above TAUS)  - f/u at clinic in 2 weeks [phone]    H Beatris PGY2  d.w Dr. Sykes

## 2020-06-06 NOTE — ED ADULT NURSE NOTE - CHPI ED NUR SYMPTOMS NEG
no dysuria/no fever/no abdominal distension/no blood in stool/no burning urination/no diarrhea/no hematuria

## 2020-06-07 LAB
CULTURE RESULTS: SIGNIFICANT CHANGE UP
SPECIMEN SOURCE: SIGNIFICANT CHANGE UP

## 2020-06-11 LAB
CULTURE RESULTS: SIGNIFICANT CHANGE UP
SPECIMEN SOURCE: SIGNIFICANT CHANGE UP

## 2020-06-12 LAB
CULTURE RESULTS: SIGNIFICANT CHANGE UP
SPECIMEN SOURCE: SIGNIFICANT CHANGE UP

## 2023-08-26 NOTE — ED PROVIDER NOTE - DATE/TIME 2
08/26/23 0600   Sleep Analysis   Sleep Report Frequently awakening;Poor   Hours Slept 5.75        06-Jun-2020 17:00